# Patient Record
(demographics unavailable — no encounter records)

---

## 2025-02-03 NOTE — END OF VISIT
[] : Resident [FreeTextEntry3] : HTN, HLD, sciatica here for post-discharge visit. DC 1/27 due to vertigo. 1 day of persistent vertigo. Admitted to neurology with stroke workup done. CTH and CTA negative. MR brain not done. TTE negative. PT/OT recommended outpatient therapy. Was Rxed for meclizine but never got it. Also has 6mm round enhancing thyroid nodule found on CT neck. Takes amlo 5 and atorva 80 with reported perfect adherence. Complaining now of sciatica pain L side radiating to calf. BP notably uncontrolled here. PE notable for slight tenderness to palpation. Strength normal, increased tone on L>R. Stroke workup was negative although MR brain did note microvascular disease. For vertigo will do PT/OT and prn meclizine 12.5mg TID For sciatica, reassess after PT. Supportive care with ice/heat packs and nsaids. For HTN he likely needs intensification of therapy For HLD continue atorva 80. Given microvascular disease would likely benefit from ezetimibe if LDL>70

## 2025-02-03 NOTE — ASSESSMENT
[FreeTextEntry1] : #Benign Paroxysmal Positional Vertigo Recent [facility] Admission due to first episode of this on , no prior episodes and no further episodes since discharge. Has not required or used Meclizine PRN. - Meclizine 12.5mg Q6 PRN prescription sent - PT and OT referral sent  #Sciatica #MSK Strain, LLE Persistent/progressive LLE lateral calf pain, often from lower back radiating to LLE.  No numbness, tingling, or muscle weakness on PE. Increased TTP with increased hypertonicity at lateral left calf, may be iso MSK strain. It's possible that given overall sciatica discomfort, patient compensates weight/position in such a way that exacerbated or led to this MSK strain. - PT referral sent - Advised to try conservative measures: RICE, NSAIDs, Tylenol, and Lidocaine patches PRN (Lidocaine patch prescription sent)  #Thyroid Nodule Incidental finding on CTA Neck inpatient of 6mm round enhancing R lobe thyroid nodule. - Thyroid US ordered  - Collect TSH at next visit  #HTN Reportedly compliant with Amlodipine 5mg QD (though on Discharge paperwork it is noted that patient was "prev[iously] on amlodipine 5mg, stopped due to not being able to get refills from PCP"). BP in office today elevated at 148/82 and 157/90 on repeat record. Suspect elevated BP today can be iso LLE pain. Denies any headaches, blurred vision, CP, dizziness/lightheadedness, or otherwise. - c/w Amlodipine 5mg QD - if BP remains elevated at next visit, consider uptitrating/adjusting BP medication regimen.   #HLD Compliant with Atorvastatin 80mg QHS No record of most recent lipid panel - c/w Atorvastatin 80mg QHS - given microvascular disease would likely benefit from ezetimibe if LDL>70 (on future lipid panel) - obtain lipid panel at next visit vs. CPE     Case presented to and discussed with Attending Dr. Lopez. Please RTC in 2 weeks to f/u regardin) Sciatica pain  2) Thyroid nodule (collect TSH at next visit)

## 2025-02-03 NOTE — PHYSICAL EXAM
[No Acute Distress] : no acute distress [Well Nourished] : well nourished [Well Developed] : well developed [Well-Appearing] : well-appearing [Normal Sclera/Conjunctiva] : normal sclera/conjunctiva [Normal Outer Ear/Nose] : the outer ears and nose were normal in appearance [Supple] : supple [No Respiratory Distress] : no respiratory distress  [No Accessory Muscle Use] : no accessory muscle use [Clear to Auscultation] : lungs were clear to auscultation bilaterally [Normal Rate] : normal rate  [Regular Rhythm] : with a regular rhythm [Normal S1, S2] : normal S1 and S2 [No Murmur] : no murmur heard [No Edema] : there was no peripheral edema [No Extremity Clubbing/Cyanosis] : no extremity clubbing/cyanosis [Soft] : abdomen soft [Non Tender] : non-tender [Non-distended] : non-distended [Normal Bowel Sounds] : normal bowel sounds [No Rash] : no rash [No Skin Lesions] : no skin lesions [Normal Gait] : normal gait [Speech Grossly Normal] : speech grossly normal [Memory Grossly Normal] : memory grossly normal [Normal Affect] : the affect was normal [Alert and Oriented x3] : oriented to person, place, and time [Normal Mood] : the mood was normal [Normal Insight/Judgement] : insight and judgment were intact [de-identified] : Muscle strength 5/5 in B/L LE flexion, extension, abduction, and adduction. Mild TTP in lateral LLE/ mid-calf region with increased hypertonicity.

## 2025-02-03 NOTE — HISTORY OF PRESENT ILLNESS
[Post-hospitalization from ___ Hospital] : Post-hospitalization from [unfilled] Hospital [Admitted on: ___] : The patient was admitted on [unfilled] [Discharged on ___] : discharged on [unfilled] [FreeTextEntry2] : Mr. Dan is a 73YO Male with PMHx HTN, HLD, and sciatica who was admitted to [facility] due to balance difficulties since 1/25/25 with room spinning sensation when he tried to get out of bed with symptoms triggered with head movement and changes in position. He also endorsed associated nausea and some blurred vision. His imaging (CT Head, CTA Head/Neck, and MRI) was unremarkable for acute stroke, though he was found to have suspected chronic microvascular changes on MRI and incidental 6mm thyroid nodule in R lobe on CTA Neck. Patient was without focal neurological deficits, did not require assistance with ambulation, and demonstrated stable gait. He was evaluated by PT/OT and deemed to not require any skilled PT needs. He was advise to take Meclizine PRN and outpatient PT/OT for vertigo.  Today, he feels "90% improved" from his admission. He has not had further episodes of vertigo. He has not taken any Meclizine PRN which he was not aware that he can take if needed (though he has not required any). His only complaint at this time is persistent LLE/ lateral calf pain in the setting of his sciatica. He reports that this pain has been persistent and worsening for the last 6-7 months, at which point his PCP diagnosed him with this. At the time, he was advised to use Lidocaine patches for the pain which he has been using (has one on today though has now run out) but this has not been enough to control the pain. Denies any numbness or tingling radiating down his leg or in general. Overall, this pain has greatly affected his quality of life because it makes it difficult for him to ambulate and carry out his daily work.  Today, his blood pressure is elevated but he denies any headaches, blurred vision, or otherwise. He does not measure his BP at home and does not know his baseline. However, he reports being compliant with his Amlodipine 5mg QD every morning. Notably, on Discharge paperwork it is noted that patient was "prev[iously] on amlodipine 5mg, stopped due to not being able to get refills from PCP."

## 2025-03-03 NOTE — HEALTH RISK ASSESSMENT
[No falls in past year] : Patient reported no falls in the past year [0] : 2) Feeling down, depressed, or hopeless: Not at all (0) [Never] : Never [With Significant Other] : lives with significant other [# of Members in Household ___] :  household currently consist of [unfilled] member(s) [Employed] : employed [] :  [Fully functional (bathing, dressing, toileting, transferring, walking, feeding)] : Fully functional (bathing, dressing, toileting, transferring, walking, feeding) [Fully functional (using the telephone, shopping, preparing meals, housekeeping, doing laundry, using] : Fully functional and needs no help or supervision to perform IADLs (using the telephone, shopping, preparing meals, housekeeping, doing laundry, using transportation, managing medications and managing finances) [de-identified] : active, walks daily [de-identified] : balanced diet, meats/beans/fish for ptoein [[id number]] : 0 [Reports changes in hearing] : Reports no changes in hearing [Reports changes in vision] : Reports no changes in vision [Reports normal functional visual acuity (ie: able to read med bottle)] : Reports poor functional visual acuity.  [ColonoscopyComments] : unclear

## 2025-03-03 NOTE — PHYSICAL EXAM
[No Acute Distress] : no acute distress [Well Nourished] : well nourished [Well Developed] : well developed [Well-Appearing] : well-appearing [Normal Sclera/Conjunctiva] : normal sclera/conjunctiva [PERRL] : pupils equal round and reactive to light [EOMI] : extraocular movements intact [Normal Outer Ear/Nose] : the outer ears and nose were normal in appearance [Normal Oropharynx] : the oropharynx was normal [No JVD] : no jugular venous distention [No Lymphadenopathy] : no lymphadenopathy [Supple] : supple [Thyroid Normal, No Nodules] : the thyroid was normal and there were no nodules present [No Respiratory Distress] : no respiratory distress  [No Accessory Muscle Use] : no accessory muscle use [Clear to Auscultation] : lungs were clear to auscultation bilaterally [Normal Rate] : normal rate  [Regular Rhythm] : with a regular rhythm [Normal S1, S2] : normal S1 and S2 [No Murmur] : no murmur heard [No Carotid Bruits] : no carotid bruits [Pedal Pulses Present] : the pedal pulses are present [No Edema] : there was no peripheral edema [No Extremity Clubbing/Cyanosis] : no extremity clubbing/cyanosis [Soft] : abdomen soft [Non Tender] : non-tender [Non-distended] : non-distended [No Masses] : no abdominal mass palpated [No HSM] : no HSM [Normal Bowel Sounds] : normal bowel sounds [Normal Posterior Cervical Nodes] : no posterior cervical lymphadenopathy [Normal Anterior Cervical Nodes] : no anterior cervical lymphadenopathy [No CVA Tenderness] : no CVA  tenderness [No Spinal Tenderness] : no spinal tenderness [No Joint Swelling] : no joint swelling [Grossly Normal Strength/Tone] : grossly normal strength/tone [Normal Station and Gait] : the gait and station were normal [Normal Motor Tone] : the muscle tone was normal [None] : there was no hypertonicity observed [Involuntary Movements] : no involuntary movements were seen [No Rash] : no rash [Coordination Grossly Intact] : coordination grossly intact [No Focal Deficits] : no focal deficits [Normal Gait] : normal gait [Deep Tendon Reflexes (DTR)] : deep tendon reflexes were 2+ and symmetric [Normal Affect] : the affect was normal [Normal Insight/Judgement] : insight and judgment were intact [de-identified] : some mild superficial veins seen on posterior L calf

## 2025-03-03 NOTE — HEALTH RISK ASSESSMENT
[No falls in past year] : Patient reported no falls in the past year [0] : 2) Feeling down, depressed, or hopeless: Not at all (0) [Never] : Never [With Significant Other] : lives with significant other [# of Members in Household ___] :  household currently consist of [unfilled] member(s) [Employed] : employed [] :  [Fully functional (bathing, dressing, toileting, transferring, walking, feeding)] : Fully functional (bathing, dressing, toileting, transferring, walking, feeding) [Fully functional (using the telephone, shopping, preparing meals, housekeeping, doing laundry, using] : Fully functional and needs no help or supervision to perform IADLs (using the telephone, shopping, preparing meals, housekeeping, doing laundry, using transportation, managing medications and managing finances) [de-identified] : active, walks daily [de-identified] : balanced diet, meats/beans/fish for ptoein [[id number]] : 0 [Reports changes in hearing] : Reports no changes in hearing [Reports changes in vision] : Reports no changes in vision [Reports normal functional visual acuity (ie: able to read med bottle)] : Reports poor functional visual acuity.  [ColonoscopyComments] : unclear

## 2025-03-03 NOTE — HISTORY OF PRESENT ILLNESS
[FreeTextEntry1] : CPE today [de-identified] : Mr. Dan is a 73YO Male with PMHx HTN, HLD, and sciatica who is here for follow-up. Of note, last note was post-discharge patient was admitted on 1/26/25 and discharged on 1/27/25.   Merrill [id number]  #Dizziness Has not had recurrent sx like his post-discharge, but has these sx when he first stands up in the morning very fast, feels like the world is spinning, losing balance. Was reccomended meclezine by the PT in the hospital but not prescribed this.   #Persistent/progressive LLE lateral calf pain, often from lower back radiating to LLE. > 4x physical therapy, would like re-referral, pain is worse when walking and better when sitting, on L calf and radiates upwards towards the upper hip, normally the pain is 6/10. Sometimes it is a 10/10 in the afternoon and he feels he cannot stand up. previously used lidocaine patch. He works at a company, doing maintenance work, always on feet.  Pain regimen: takes nothing, has been told to take tylenol and aspirin (?) unclear if advil, but patient says that the pain lingers more. Takes tylenol at 10am and 2pm, because he feels like he doesn't get leg pain when he lays down, only happens at night. No pain while sittiing.   #BP Reportedly compliant with Amlodipine 5mg QD (though on Discharge paperwork it is noted that patient was "prev[iously] on amlodipine 5mg, stopped due to not being able to get refills from PCP"). BP was last 157/90 in-office. Patient reported taking 4 of amlodipine 5mg (20mg total) and his wife's "25mg" pill, because he was afraid when his BP was /78. Has a BP machine at home and takes it in the morning. He said he was curious and took it in multiple positions.   #HLD Compliant with Atorvastatin 80mg QHS - takes at night. Prescription to be refilled.

## 2025-03-03 NOTE — END OF VISIT
[] : Resident [FreeTextEntry3] : HTN, HLD, chronic microvascular changes, ?vertigo here for CPE HLD -- refill atorva 80 HTN -- took too much amlodipine accidentally, now taking 10mg daily. /87 now. Home BP 130s-140s but does not follow SPRINT protocol. Has had headaches before but none recently Subjective orthostatic hypotension -- objective orthostatics negative in office. May be hydration related as it happens in the morning. Try off meclizine as that may worsen this and he doesn't describe vertigo. L leg cramping/pain when walking. Would benefit from arterial US to rule out claudication. PT for structured exercise. Supportive care with Tylenol.

## 2025-03-03 NOTE — HISTORY OF PRESENT ILLNESS
[FreeTextEntry1] : CPE today [de-identified] : Mr. Dan is a 75YO Male with PMHx HTN, HLD, and sciatica who is here for follow-up. Of note, last note was post-discharge patient was admitted on 1/26/25 and discharged on 1/27/25.   Merrill [id number]  #Dizziness Has not had recurrent sx like his post-discharge, but has these sx when he first stands up in the morning very fast, feels like the world is spinning, losing balance. Was reccomended meclezine by the PT in the hospital but not prescribed this.   #Persistent/progressive LLE lateral calf pain, often from lower back radiating to LLE. > 4x physical therapy, would like re-referral, pain is worse when walking and better when sitting, on L calf and radiates upwards towards the upper hip, normally the pain is 6/10. Sometimes it is a 10/10 in the afternoon and he feels he cannot stand up. previously used lidocaine patch. He works at a company, doing maintenance work, always on feet.  Pain regimen: takes nothing, has been told to take tylenol and aspirin (?) unclear if advil, but patient says that the pain lingers more. Takes tylenol at 10am and 2pm, because he feels like he doesn't get leg pain when he lays down, only happens at night. No pain while sittiing.   #BP Reportedly compliant with Amlodipine 5mg QD (though on Discharge paperwork it is noted that patient was "prev[iously] on amlodipine 5mg, stopped due to not being able to get refills from PCP"). BP was last 157/90 in-office. Patient reported taking 4 of amlodipine 5mg (20mg total) and his wife's "25mg" pill, because he was afraid when his BP was /78. Has a BP machine at home and takes it in the morning. He said he was curious and took it in multiple positions.   #HLD Compliant with Atorvastatin 80mg QHS - takes at night. Prescription to be refilled.

## 2025-03-03 NOTE — PHYSICAL EXAM
[No Acute Distress] : no acute distress [Well Nourished] : well nourished [Well Developed] : well developed [Well-Appearing] : well-appearing [Normal Sclera/Conjunctiva] : normal sclera/conjunctiva [PERRL] : pupils equal round and reactive to light [EOMI] : extraocular movements intact [Normal Outer Ear/Nose] : the outer ears and nose were normal in appearance [Normal Oropharynx] : the oropharynx was normal [No JVD] : no jugular venous distention [No Lymphadenopathy] : no lymphadenopathy [Supple] : supple [Thyroid Normal, No Nodules] : the thyroid was normal and there were no nodules present [No Respiratory Distress] : no respiratory distress  [No Accessory Muscle Use] : no accessory muscle use [Clear to Auscultation] : lungs were clear to auscultation bilaterally [Normal Rate] : normal rate  [Regular Rhythm] : with a regular rhythm [Normal S1, S2] : normal S1 and S2 [No Murmur] : no murmur heard [No Carotid Bruits] : no carotid bruits [Pedal Pulses Present] : the pedal pulses are present [No Edema] : there was no peripheral edema [No Extremity Clubbing/Cyanosis] : no extremity clubbing/cyanosis [Soft] : abdomen soft [Non Tender] : non-tender [Non-distended] : non-distended [No Masses] : no abdominal mass palpated [No HSM] : no HSM [Normal Bowel Sounds] : normal bowel sounds [Normal Posterior Cervical Nodes] : no posterior cervical lymphadenopathy [Normal Anterior Cervical Nodes] : no anterior cervical lymphadenopathy [No CVA Tenderness] : no CVA  tenderness [No Spinal Tenderness] : no spinal tenderness [No Joint Swelling] : no joint swelling [Grossly Normal Strength/Tone] : grossly normal strength/tone [Normal Station and Gait] : the gait and station were normal [Normal Motor Tone] : the muscle tone was normal [None] : there was no hypertonicity observed [Involuntary Movements] : no involuntary movements were seen [No Rash] : no rash [Coordination Grossly Intact] : coordination grossly intact [No Focal Deficits] : no focal deficits [Normal Gait] : normal gait [Deep Tendon Reflexes (DTR)] : deep tendon reflexes were 2+ and symmetric [Normal Affect] : the affect was normal [Normal Insight/Judgement] : insight and judgment were intact [de-identified] : some mild superficial veins seen on posterior L calf

## 2025-04-21 NOTE — INTERPRETER SERVICES
[Language Line ] : provided by [employer]   [Time Spent: ____ minutes] : Total time spent using  services: [unfilled] minutes. The patient's primary language is not English thus required  services. [Interpreters_IDNumber] : [id number] [Interpreters_FullName] : Jacqui [TWNoteComboBox1] : Somali

## 2025-04-21 NOTE — HISTORY OF PRESENT ILLNESS
[FreeTextEntry1] : BP check and follow up [de-identified] : #Somatic c/o - Patient feels different somatic complaints compared to last visit.  Re: his leg pain, he says he does at-home exercises, warm compresses and menthol patches. Does not want to go to PT although referral given feb-3-2025. C/o 1 month h/o more fatigue since last visit, no difficulties with nighttime sleepiness but feels daytime sleepiness, and people have begun to ask if he is "ok" which makes him feel self-conscious. Also complains of "stiffer fingers" when he holds his phone to his ear for a long time it slips out. He has occasional headaches, does not take any PRN analgesia, these go away on his own, largely associated with MSK scalp pain radiating down to his occipitalis muscle  #BP - says his SBP at home is 160s with BP cuff, today he did not eat anything, just drank coffee. He now takes amlodipine 10mg in the AM but says that when he feels "shaking or worry or vision blurry" sometimes takes another pill of amlodipine (total of 20mg). Endorses ongoing dizziness sometimes when standing up rapidly.  #Anxiety - based on above hx, on further probing tells me" sometimes i want to laugh or cry in the morning and the pill [amlodipine] helps me with that". Thoughts around anxiety include if he will have a job or be sent back to his country. Says that his wife is understanding and his home life is good. Reports that his boss at work and work environment is also good. Denies stressors that are new, but says that he has more fatigue and worry about these thoughts than before.

## 2025-04-21 NOTE — PHYSICAL EXAM
[No Joint Swelling] : no joint swelling [Grossly Normal Strength/Tone] : grossly normal strength/tone [Coordination Grossly Intact] : coordination grossly intact [No Focal Deficits] : no focal deficits [Normal Gait] : normal gait [Normal] : normal gait, coordination grossly intact, no focal deficits and deep tendon reflexes were 2+ and symmetric [de-identified] : visual acuity 20/25 [de-identified] : wrist and finger examination normal

## 2025-04-21 NOTE — HEALTH RISK ASSESSMENT
[No] : No [No falls in past year] : Patient reported no falls in the past year [1] : 2) Feeling down, depressed, or hopeless for several days (1) [Nearly Every Day (3)] : 4.) Feeling tired or having little energy? Nearly every day [Several Days (1)] : 8.) Moving or speaking so slowly that other people could have noticed, or the opposite, moving or speaking faster than usual? Several days [Not at All (0)] : 9.) Thoughts that you would be off dead or of hurting yourself in some way? Not at all [Mild] : Severity of Depression is Mild [Time Spent: ___ Minutes] : I spent [unfilled] minutes performing a depression screening for this patient. [Never] : Never [de-identified] : active at work, walks without difficulty [[id number]] : 2 [[id number]] : 8

## 2025-04-21 NOTE — ASSESSMENT
[FreeTextEntry1] : d/w Dr. Montero Im follow-up in 1 week to compare BP cuff at home with in office cuff

## 2025-05-05 NOTE — HEALTH RISK ASSESSMENT
[Little interest or pleasure doing things] : 1) Little interest or pleasure doing things [Feeling down, depressed, or hopeless] : 2) Feeling down, depressed, or hopeless [0] : 2) Feeling down, depressed, or hopeless: Not at all (0) [Never] : Never [Time Spent: ___ Minutes] : I spent [unfilled] minutes performing a depression screening for this patient. [[id number]] : 0

## 2025-05-05 NOTE — PHYSICAL EXAM
[Normal] : no joint swelling and grossly normal strength and tone [de-identified] : tender posterior muscles of neck

## 2025-05-05 NOTE — HISTORY OF PRESENT ILLNESS
[FreeTextEntry1] : Follow up visit on Blood pressure [de-identified] : 74 YO Male with PMHx HTN, HLD, and sciatica presents for follow up. Taking amlodipine once a day and keeping a log with SBP 130s-170s. Notes this is causing him more anxiety but doing well with lexapro. Notes he has appt with GI and at that time will discuss obtaining colonoscopy, and he is amenable to doing so. Notes his vision is normal and just uses glasses for up close reading. He notes for the past month he has been having posterior neck tenderness that is mild and does not radiate into the arms. Uses warm water in the shower with some relief. Still works in a factory and walks a lot.

## 2025-05-05 NOTE — END OF VISIT
[] : Resident [FreeTextEntry3] : 75M w/HTN here for BP check, still elevated, will change to amlodipine 10-Olmesartan 20 combo med per pt preference.  Keep BP log and return next Monday w/Dr. Kowalski.

## 2025-05-05 NOTE — REVIEW OF SYSTEMS
[Fatigue] : fatigue [Heartburn] : heartburn [Negative] : Constitutional [Fever] : no fever [Chills] : no chills [Chest Pain] : no chest pain [Palpitations] : no palpitations [Shortness Of Breath] : no shortness of breath [Cough] : no cough [Abdominal Pain] : no abdominal pain